# Patient Record
Sex: MALE | Race: WHITE | NOT HISPANIC OR LATINO | Employment: FULL TIME | URBAN - METROPOLITAN AREA
[De-identification: names, ages, dates, MRNs, and addresses within clinical notes are randomized per-mention and may not be internally consistent; named-entity substitution may affect disease eponyms.]

---

## 2017-06-08 ENCOUNTER — TRANSCRIBE ORDERS (OUTPATIENT)
Dept: ADMINISTRATIVE | Facility: HOSPITAL | Age: 37
End: 2017-06-08

## 2017-06-08 DIAGNOSIS — R10.9 ABDOMINAL PAIN, UNSPECIFIED LOCATION: Primary | ICD-10-CM

## 2017-06-13 ENCOUNTER — HOSPITAL ENCOUNTER (OUTPATIENT)
Dept: ULTRASOUND IMAGING | Facility: HOSPITAL | Age: 37
Discharge: HOME | End: 2017-06-13
Admitting: FAMILY MEDICINE

## 2017-06-13 ENCOUNTER — HOSPITAL ENCOUNTER (OUTPATIENT)
Dept: GENERAL RADIOLOGY | Facility: HOSPITAL | Age: 37
Discharge: HOME | End: 2017-06-13

## 2017-06-13 DIAGNOSIS — R10.9 ABDOMINAL PAIN, UNSPECIFIED LOCATION: ICD-10-CM

## 2017-06-13 PROCEDURE — 76700 US EXAM ABDOM COMPLETE: CPT

## 2017-06-13 PROCEDURE — 74247: CPT

## 2017-08-07 ENCOUNTER — OFFICE (OUTPATIENT)
Dept: URBAN - METROPOLITAN AREA CLINIC 75 | Facility: CLINIC | Age: 37
End: 2017-08-07

## 2017-08-07 VITALS
SYSTOLIC BLOOD PRESSURE: 132 MMHG | WEIGHT: 183 LBS | HEIGHT: 71 IN | DIASTOLIC BLOOD PRESSURE: 70 MMHG | HEART RATE: 88 BPM

## 2017-08-07 DIAGNOSIS — R11.0 NAUSEA: ICD-10-CM

## 2017-08-07 DIAGNOSIS — R10.10 UPPER ABDOMINAL PAIN, UNSPECIFIED: ICD-10-CM

## 2017-08-07 PROCEDURE — 99203 OFFICE O/P NEW LOW 30 MIN: CPT | Performed by: INTERNAL MEDICINE

## 2017-08-07 NOTE — SERVICEHPINOTES
Thank you very much for allowing me to evaluate Mr. Norris.  As you know he is a pleasant 36 year old gentleman that presents to our office for a new patient consultation for complaints of upper abdominal pain and nausea. The abdominal pain is located just below the sternum and just to the right of midline.  The symptoms started June 2, 2017.  He describes the pain as a non-radiating "knawing or burning pain" that  occurs daily but will come and go through out the day. He states that the pain gets better with food and will start to worsen 2-3 hours after meals. He also reported nausea without emesis. The pain and nausea is accompanied by a feeling that he needs to belch but is unable to do so.  He had a H. Pylori breath test that was negative however, he was on a PPI at the time.  He was started on amoxicillin to empirically treat for an infectious process. He did a trial of Dexilant and Protonix neither of which were helpful. He then took Zantac BID but stopped 2 days ago as he is feeling better. Currently, he is only taking amoxicillin and is about to complete the course in a few days. He denies NSAID use.. Is a non-smoker.He had a normal abdominal ultrasound 6/13/2017, upper GI with contrast 6/14/2017 and CT of abdomen. At present, he is much better than when his symptoms started and has not had any pain for the last day and a half. He denies dysphagia, odynophagia, heartburn, reflux, nausea, vomiting, fever or chills.He does not report any lower GI issues at this time.  He denies blood in his stool, melena or unexplained weight loss.Labs Reviewed:BR6/15/2017 - normal CBC, CMP and amylase

## 2021-04-16 ENCOUNTER — BULK ORDERING (OUTPATIENT)
Dept: CASE MANAGEMENT | Facility: OTHER | Age: 41
End: 2021-04-16

## 2021-04-16 DIAGNOSIS — Z23 IMMUNIZATION DUE: ICD-10-CM
